# Patient Record
Sex: FEMALE | Race: WHITE | NOT HISPANIC OR LATINO | URBAN - METROPOLITAN AREA
[De-identification: names, ages, dates, MRNs, and addresses within clinical notes are randomized per-mention and may not be internally consistent; named-entity substitution may affect disease eponyms.]

---

## 2021-01-01 ENCOUNTER — LAB OUTSIDE AN ENCOUNTER (OUTPATIENT)
Dept: URBAN - METROPOLITAN AREA CLINIC 70 | Facility: CLINIC | Age: 76
End: 2021-01-01

## 2021-01-01 ENCOUNTER — OFFICE VISIT (OUTPATIENT)
Dept: URBAN - METROPOLITAN AREA CLINIC 70 | Facility: CLINIC | Age: 76
End: 2021-01-01
Payer: MEDICARE

## 2021-01-01 ENCOUNTER — WEB ENCOUNTER (OUTPATIENT)
Dept: URBAN - METROPOLITAN AREA CLINIC 70 | Facility: CLINIC | Age: 76
End: 2021-01-01

## 2021-01-01 ENCOUNTER — TELEPHONE ENCOUNTER (OUTPATIENT)
Dept: URBAN - METROPOLITAN AREA CLINIC 70 | Facility: CLINIC | Age: 76
End: 2021-01-01

## 2021-01-01 ENCOUNTER — DASHBOARD ENCOUNTERS (OUTPATIENT)
Age: 76
End: 2021-01-01

## 2021-01-01 ENCOUNTER — OUT OF OFFICE VISIT (OUTPATIENT)
Dept: URBAN - METROPOLITAN AREA MEDICAL CENTER 42 | Facility: MEDICAL CENTER | Age: 76
End: 2021-01-01
Payer: MEDICARE

## 2021-01-01 ENCOUNTER — OFFICE VISIT (OUTPATIENT)
Dept: URBAN - METROPOLITAN AREA CLINIC 70 | Facility: CLINIC | Age: 76
End: 2021-01-01

## 2021-01-01 ENCOUNTER — OFFICE VISIT (OUTPATIENT)
Dept: URBAN - METROPOLITAN AREA CLINIC 94 | Facility: CLINIC | Age: 76
End: 2021-01-01

## 2021-01-01 VITALS
SYSTOLIC BLOOD PRESSURE: 122 MMHG | TEMPERATURE: 97.9 F | HEIGHT: 63 IN | BODY MASS INDEX: 22.36 KG/M2 | DIASTOLIC BLOOD PRESSURE: 72 MMHG | HEART RATE: 68 BPM | WEIGHT: 126.2 LBS

## 2021-01-01 VITALS
HEIGHT: 63 IN | BODY MASS INDEX: 22.29 KG/M2 | WEIGHT: 125.8 LBS | HEART RATE: 77 BPM | TEMPERATURE: 97.3 F | SYSTOLIC BLOOD PRESSURE: 116 MMHG | DIASTOLIC BLOOD PRESSURE: 69 MMHG

## 2021-01-01 DIAGNOSIS — R11.0 CHRONIC NAUSEA: ICD-10-CM

## 2021-01-01 DIAGNOSIS — Z79.01 ANTICOAGULANT LONG-TERM USE: ICD-10-CM

## 2021-01-01 DIAGNOSIS — K21.9 GERD WITHOUT ESOPHAGITIS: ICD-10-CM

## 2021-01-01 DIAGNOSIS — Z93.1 PEG (PERCUTANEOUS ENDOSCOPIC GASTROSTOMY) STATUS: ICD-10-CM

## 2021-01-01 DIAGNOSIS — R14.0 ABDOMINAL BLOATING: ICD-10-CM

## 2021-01-01 DIAGNOSIS — K94.23 COMPLICATION OF FEEDING TUBE: ICD-10-CM

## 2021-01-01 DIAGNOSIS — Z43.1 ATTENTION TO G-TUBE: ICD-10-CM

## 2021-01-01 PROCEDURE — 99203 OFFICE O/P NEW LOW 30 MIN: CPT | Performed by: NURSE PRACTITIONER

## 2021-01-01 PROCEDURE — 99221 1ST HOSP IP/OBS SF/LOW 40: CPT | Performed by: INTERNAL MEDICINE

## 2021-01-01 PROCEDURE — 99232 SBSQ HOSP IP/OBS MODERATE 35: CPT | Performed by: INTERNAL MEDICINE

## 2021-01-01 PROCEDURE — G8427 DOCREV CUR MEDS BY ELIG CLIN: HCPCS | Performed by: INTERNAL MEDICINE

## 2021-01-01 RX ORDER — DILTIAZEM HYDROCHLORIDE 240 MG/1
CAPSULE, EXTENDED RELEASE ORAL
Qty: 0 | Refills: 0 | Status: ACTIVE | COMMUNITY
Start: 2018-01-03

## 2021-01-01 RX ORDER — POTASSIUM CHLORIDE 750 MG/1
TABLET, EXTENDED RELEASE ORAL
Qty: 0 | Refills: 0 | Status: ACTIVE | COMMUNITY
Start: 2018-01-03

## 2021-01-01 RX ORDER — ESOMEPRAZOLE MAGNESIUM 40 MG/1
1 CAPSULE CAPSULE, DELAYED RELEASE ORAL ONCE A DAY
Qty: 90 | Refills: 3 | OUTPATIENT
Start: 2021-01-01

## 2021-01-01 RX ORDER — CETIRIZINE HYDROCHLORIDE 10 MG/1
TAKE 1 TABLET (10 MG) BY ORAL ROUTE ONCE DAILY TABLET, FILM COATED ORAL 1
Qty: 0 | Refills: 0 | Status: ACTIVE | COMMUNITY
Start: 1900-01-01

## 2021-01-01 RX ORDER — FUROSEMIDE 20 MG/1
TABLET ORAL
Qty: 0 | Refills: 0 | Status: ACTIVE | COMMUNITY
Start: 2017-12-19

## 2021-01-01 RX ORDER — DIGOXIN 125 UG/1
TABLET ORAL
Qty: 0 | Refills: 0 | Status: ACTIVE | COMMUNITY
Start: 2018-01-03

## 2021-01-01 RX ORDER — RIVAROXABAN 20 MG/1
TABLET, FILM COATED ORAL
Qty: 0 | Refills: 0 | Status: ACTIVE | COMMUNITY
Start: 2018-01-03

## 2021-01-01 RX ORDER — ADHESIVE TAPE 3"X 2.3 YD
TAKE 1 CAPSULE BY ORAL ROUTE DAILY TAPE, NON-MEDICATED TOPICAL 1
Qty: 0 | Refills: 0 | Status: ACTIVE | COMMUNITY
Start: 1900-01-01

## 2021-01-01 RX ORDER — ATORVASTATIN CALCIUM 10 MG/1
TABLET, FILM COATED ORAL
Qty: 0 | Refills: 0 | Status: ACTIVE | COMMUNITY
Start: 2017-09-28

## 2021-01-01 RX ORDER — LEVOTHYROXINE SODIUM 75 UG/1
TABLET ORAL
Qty: 0 | Refills: 0 | Status: ACTIVE | COMMUNITY
Start: 2018-01-08

## 2021-01-01 RX ORDER — VITAMIN E 200 UNIT
TAKE 1 CAPSULE BY ORAL ROUTE DAILY CAPSULE ORAL 1
Qty: 0 | Refills: 0 | Status: ACTIVE | COMMUNITY
Start: 1900-01-01

## 2021-01-01 RX ORDER — GABAPENTIN 100 MG/1
CAPSULE ORAL
Qty: 0 | Refills: 0 | Status: ACTIVE | COMMUNITY
Start: 2018-01-03

## 2021-01-01 RX ORDER — ESOMEPRAZOLE MAGNESIUM 40 MG/1
1 CAPSULE CAPSULE, DELAYED RELEASE ORAL ONCE A DAY
Qty: 90 | Refills: 3 | Status: ACTIVE | COMMUNITY
Start: 2021-01-01

## 2021-01-01 RX ORDER — SERTRALINE 50 MG/1
TABLET, FILM COATED ORAL
Qty: 0 | Refills: 0 | Status: ACTIVE | COMMUNITY
Start: 2017-11-15

## 2021-05-05 NOTE — PHYSICAL EXAM GASTROINTESTINAL
Abdomen , soft, nontender, nondistended , no guarding or rigidity , no masses palpable , normal bowel sounds , Liver and Spleen , no hepatomegaly present , no hepatosplenomegaly , liver nontender , spleen not palpable, Gtube intact and without erythema,, edema, or drainage.

## 2021-05-05 NOTE — HPI-TODAY'S VISIT:
Pt is a 76 yr old female whom presents today for a hospital follow up.   She was an inpatient at [facility] 4/22/21-5/3/21 for hyponatremia, Afib with RVR, and PEG issues. This office was consulted to see her while inpatient for her PEG issues for a malfunctioning PEG.  PEG was originally placed at [facility] 03/2021. G tube was replaced however with an 18 Fr Cook balloon type G tube on 4/25/21. Proper placement in stomach confirmed with KUB with gastrograffin. She did have resolution of abdominal bloating.  She was to continue Cipro and Flagyl x10 days upon discharge home due to non-healed tract of previous Gtube.  Empirically for peritonitis. She has a PEG due to hx of dysphagia form head and neck cancer with radiation. She is now on Eliquis for A-fib.  Has type A dissecting aneurysm.  Eliquis managed per Dr Hill with [facility]. Pt had an EGD 1/25/18 for dysphagia which showed gastritis and was neg for H pylori. CT A/p was negative. Today she presents with her daughter.  Pt is receiving continuous tube feeds at 45 ml/hr.  Denies any Gtube leaking and flushing without problem.   Gtube site wihtout erythema, edema, or drainage. States she is taking po Flagyl, but is not taking po Cipro as it causes her nausea. Reports that Dr Hill is having her see a cardiothoracic surgeon to evaluate her dissecting aneurysm and she has appt next week. Daughter states pt is taking daily Nexium and needs refill. Daughter also states they are speaking to nutritionist at [facility] for any feeding formula questions they may have.

## 2021-06-03 NOTE — HPI-TODAY'S VISIT:
Pt presents today with her daughter for EGD consult. Pt states she has recently seen Dr Brenden Aceves-cardiothoracic surgeon at [facility] and she is to have open heart surgery for dissecting aneurysm and MV repair.  States that the surgeon wants her to have an EGD prior to scheduling her surgery. Regarding her PEG, pt states she has been tolerating her feedings well.  PEG site without redness or swelling.  Minimal clear drainage. Pt is still on Eliquis and sees Dr hill.  -OF NOTE LOV:  Pt is a 76 yr old female whom presents today for a hospital follow up.   She was an inpatient at [facility] 4/22/21-5/3/21 for hyponatremia, Afib with RVR, and PEG issues. This office was consulted to see her while inpatient for her PEG issues for a malfunctioning PEG.  PEG was originally placed at [facility] 03/2021. G tube was replaced however with an 18 Fr Cook balloon type G tube on 4/25/21. Proper placement in stomach confirmed with KUB with gastrograffin. She did have resolution of abdominal bloating.  She was to continue Cipro and Flagyl x10 days upon discharge home due to non-healed tract of previous Gtube.  Empirically for peritonitis. She has a PEG due to hx of dysphagia form head and neck cancer with radiation. She is now on Eliquis for A-fib.  Has type A dissecting aneurysm.  Eliquis managed per Dr Hill with [facility]. Pt had an EGD 1/25/18 for dysphagia which showed gastritis and was neg for H pylori. CT A/p was negative. Today she presents with her daughter.  Pt is receiving continuous tube feeds at 45 ml/hr.  Denies any Gtube leaking and flushing without problem.   Gtube site wihtout erythema, edema, or drainage. States she is taking po Flagyl, but is not taking po Cipro as it causes her nausea. Reports that Dr Hill is having her see a cardiothoracic surgeon to evaluate her dissecting aneurysm and she has appt next week. Daughter states pt is taking daily Nexium and needs refill. Daughter also states they are speaking to nutritionist at [facility] for any feeding formula questions they may have.

## 2021-06-17 PROBLEM — 302109006: Status: ACTIVE | Noted: 2021-01-01

## 2021-06-17 PROBLEM — 266435005: Status: ACTIVE | Noted: 2021-01-01

## 2021-10-15 ENCOUNTER — OFFICE VISIT (OUTPATIENT)
Dept: URBAN - METROPOLITAN AREA MEDICAL CENTER 42 | Facility: MEDICAL CENTER | Age: 76
End: 2021-10-15